# Patient Record
Sex: FEMALE | Race: WHITE | NOT HISPANIC OR LATINO | Employment: OTHER | URBAN - NONMETROPOLITAN AREA
[De-identification: names, ages, dates, MRNs, and addresses within clinical notes are randomized per-mention and may not be internally consistent; named-entity substitution may affect disease eponyms.]

---

## 2017-01-05 ENCOUNTER — OFFICE VISIT (OUTPATIENT)
Dept: INTERNAL MEDICINE | Facility: CLINIC | Age: 82
End: 2017-01-05

## 2017-01-05 VITALS
BODY MASS INDEX: 23.39 KG/M2 | WEIGHT: 137 LBS | HEIGHT: 64 IN | HEART RATE: 77 BPM | TEMPERATURE: 98.5 F | DIASTOLIC BLOOD PRESSURE: 80 MMHG | OXYGEN SATURATION: 96 % | SYSTOLIC BLOOD PRESSURE: 140 MMHG

## 2017-01-05 DIAGNOSIS — G81.90 HEMIPARESIS (HCC): ICD-10-CM

## 2017-01-05 DIAGNOSIS — D50.0 IRON DEFICIENCY ANEMIA DUE TO CHRONIC BLOOD LOSS: Primary | ICD-10-CM

## 2017-01-05 DIAGNOSIS — G40.209 PARTIAL SYMPTOMATIC EPILEPSY WITH COMPLEX PARTIAL SEIZURES, NOT INTRACTABLE, WITHOUT STATUS EPILEPTICUS (HCC): ICD-10-CM

## 2017-01-05 PROCEDURE — 99214 OFFICE O/P EST MOD 30 MIN: CPT | Performed by: INTERNAL MEDICINE

## 2017-01-05 PROCEDURE — 36415 COLL VENOUS BLD VENIPUNCTURE: CPT | Performed by: INTERNAL MEDICINE

## 2017-01-05 RX ORDER — IRON POLYSACCHARIDE COMPLEX 150 MG
150 CAPSULE ORAL DAILY
Qty: 60 CAPSULE | Refills: 2 | Status: SHIPPED | OUTPATIENT
Start: 2017-01-05 | End: 2017-06-21 | Stop reason: ALTCHOICE

## 2017-01-05 RX ORDER — ATORVASTATIN CALCIUM 40 MG/1
TABLET, FILM COATED ORAL
COMMUNITY
Start: 2016-12-19 | End: 2017-04-03 | Stop reason: SDUPTHER

## 2017-01-05 RX ORDER — OMEPRAZOLE 20 MG/1
20 CAPSULE, DELAYED RELEASE ORAL DAILY
Qty: 30 CAPSULE | Refills: 2 | Status: SHIPPED | OUTPATIENT
Start: 2017-01-05 | End: 2017-04-03 | Stop reason: SDUPTHER

## 2017-01-05 NOTE — MR AVS SNAPSHOT
Tanvi Obregon   1/5/2017 3:30 PM   Office Visit    Provider:  Jorge Olmos MD   Department:  [facility] PRIMARY CARE   Dept Phone:  [phone]                Your Full Care Plan              Today's Medication Changes          These changes are accurate as of: 1/5/17  4:03 PM.  If you have any questions, ask your nurse or doctor.               New Medication(s)Ordered:     omeprazole 20 MG capsule   Commonly known as:  priLOSEC   Take 1 capsule by mouth Daily.   Started by:  Jorge Olmos MD         Medication(s)that have changed:     atorvastatin 40 MG tablet   Commonly known as:  LIPITOR   What changed:  Another medication with the same name was removed. Continue taking this medication, and follow the directions you see here.   Changed by:  Jorge Olmos MD         Stop taking medication(s)listed here:     cefdinir 300 MG capsule   Commonly known as:  OMNICEF   Stopped by:  Jorge Olmos MD                Where to Get Your Medications      These medications were sent to [address] AT [facility] [phone]  - [phone] [address] [address]     Phone:  [phone]     iron polysaccharides 150 MG capsule    omeprazole 20 MG capsule                  Your Updated Medication List          This list is accurate as of: 1/5/17  4:03 PM.  Always use your most recent med list.                aspirin  MG tablet   Take 1 tablet by mouth daily.       atorvastatin 40 MG tablet   Commonly known as:  LIPITOR       famotidine 20 MG tablet   Commonly known as:  PEPCID   Take 1 tablet by mouth At Night As Needed for heartburn.       iron polysaccharides 150 MG capsule   Commonly known as:  NIFEREX   Take 1 capsule by mouth Daily.       levETIRAcetam 750 MG tablet   Commonly known as:  KEPPRA   Take 1 tablet by mouth 2 (Two) Times a Day.       levothyroxine 75 MCG tablet   Commonly known  "as:  SYNTHROID, LEVOTHROID   Take 1 tablet by mouth daily.       omeprazole 20 MG capsule   Commonly known as:  priLOSEC   Take 1 capsule by mouth Daily.               We Performed the Following     CBC (No Diff)       You Were Diagnosed With        Codes Comments    Iron deficiency anemia due to chronic blood loss    -  Primary ICD-10-CM: D50.0  ICD-9-CM: 280.0     Hemiparesis     ICD-10-CM: G81.90  ICD-9-CM: 342.90     Partial symptomatic epilepsy with complex partial seizures, not intractable, without status epilepticus     ICD-10-CM: G40.209  ICD-9-CM: 345.40       Instructions     None    Patient Instructions History      MyChart Signup     Our records indicate that you have declined [employer] signup. If you would like to sign up for [employer], please email [email or web address] or call [phone] to obtain an activation code.             Other Info from Your Visit           Your Appointments     Jun 02, 2017  2:30 PM EDT   Follow Up with Pernell Alba MD, FAAN   [facility] NEUROLOGY (--)    [address] [place] [address], [address] [address] [phone]           Arrive 15 minutes prior to appointment.              Allergies     No Known Allergies      Reason for Visit     [facility] follow up      Vital Signs     Blood Pressure Pulse Temperature Height Weight Oxygen Saturation    140/80 77 98.5 °F (36.9 °C) 64\" (162.6 cm) 137 lb (62.1 kg) 96%    Body Mass Index Smoking Status                23.52 kg/m2 Former Smoker          Problems and Diagnoses Noted     Paralysis one side of body    Iron deficiency anemia    Seizure disorder      "

## 2017-01-05 NOTE — PROGRESS NOTES
Subjective  Tanvi Obregon is a 81 y.o. female    HPI patient with a history of CVA and dementia brought in by her son recent hospitalization for confusion evaluation at the hospital showed evidence of seizure-like activity she is now on antiseizure medication she is coming here for post discharge for follow-up. No new complaints      The following portions of the patient's history were reviewed and updated as appropriate: allergies, current medications, past family history, past medical history, past social history, past surgical history, and problem list.     Review of Systems   Constitutional: Negative.  Negative for activity change, appetite change, fatigue and fever.   HENT: Negative for congestion, ear discharge, ear pain and trouble swallowing.    Eyes: Negative for photophobia and visual disturbance.   Respiratory: Negative for cough and shortness of breath.    Cardiovascular: Negative for chest pain and palpitations.   Gastrointestinal: Negative for abdominal distention, abdominal pain, constipation, diarrhea, nausea and vomiting.   Endocrine: Negative.    Genitourinary: Negative for dysuria, hematuria and urgency.   Musculoskeletal: Negative for arthralgias, back pain, joint swelling and myalgias.   Skin: Negative for color change and rash.   Allergic/Immunologic: Negative.    Neurological: Negative for dizziness, weakness, light-headedness and headaches.   Hematological: Negative for adenopathy. Does not bruise/bleed easily.   Psychiatric/Behavioral: Positive for confusion, decreased concentration and sleep disturbance. Negative for agitation and dysphoric mood. The patient is not nervous/anxious.        Vitals:    01/05/17 1514   BP: 140/80   Pulse: 77   Temp: 98.5 °F (36.9 °C)   SpO2: 96%       Objective  Physical Exam   Constitutional: She is oriented to person, place, and time. She appears well-developed and well-nourished. No distress.   HENT:   Nose: Nose normal.   Mouth/Throat: Oropharynx is  clear and moist.   Eyes: Conjunctivae and EOM are normal. No scleral icterus.   Neck: No tracheal deviation present. No thyromegaly present.   Cardiovascular: Normal rate and regular rhythm.  Exam reveals no friction rub.    No murmur heard.  Pulmonary/Chest: No respiratory distress. She has no wheezes. She has no rales.   Abdominal: Soft. She exhibits no distension and no mass. There is no tenderness. There is no guarding.   Musculoskeletal: Normal range of motion. She exhibits no deformity.   Lymphadenopathy:     She has no cervical adenopathy.   Neurological: She is alert and oriented to person, place, and time. She has normal reflexes. No cranial nerve deficit. Coordination normal.   Expressive aphasia   Skin: Skin is warm and dry. No rash noted. No erythema.   Psychiatric: She has a normal mood and affect. Her behavior is normal. Judgment and thought content normal.       Diagnoses and all orders for this visit:    Iron deficiency anemia due to chronic blood loss. Hospital records reviewed showed evidence of microcytic anemia she does not have overt bleeding currently not a good candidate for endoscopic evaluation as per discussion with her son will try empiric and tie acid therapy with omeprazole. Add on iron supplement repeat CBC in 4-6 weeks on follow-up. Hemodynamically stable denies shortness of breath or chest pain    Hemiparesis. Stable set up home health and physical therapy along with the occupational therapy    Partial symptomatic epilepsy with complex partial seizures, not intractable, without status epilepticus. Stable on current medications has an appointment with neurology

## 2017-01-06 LAB
ERYTHROCYTE [DISTWIDTH] IN BLOOD BY AUTOMATED COUNT: 18.8 % (ref 11.3–14.5)
HCT VFR BLD AUTO: 35 % (ref 34.5–44)
HGB BLD-MCNC: 10.5 G/DL (ref 11.5–15.5)
MCH RBC QN AUTO: 24.8 PG (ref 27–31)
MCHC RBC AUTO-ENTMCNC: 30 G/DL (ref 32–36)
MCV RBC AUTO: 82.5 FL (ref 80–99)
PLATELET # BLD AUTO: 504 10*3/MM3 (ref 150–450)
RBC # BLD AUTO: 4.24 10*6/MM3 (ref 3.89–5.14)
WBC # BLD AUTO: 11.29 10*3/MM3 (ref 3.5–10.8)

## 2017-01-16 ENCOUNTER — TELEPHONE (OUTPATIENT)
Dept: INTERNAL MEDICINE | Facility: CLINIC | Age: 82
End: 2017-01-16

## 2017-01-16 NOTE — TELEPHONE ENCOUNTER
Patients son is wondering if she could be referred to [facility]. He is also wondering if she could get speech therapy, OT, and PT.    Would you be ok with this?

## 2017-01-17 DIAGNOSIS — G81.90 HEMIPARESIS (HCC): Primary | ICD-10-CM

## 2017-01-27 ENCOUNTER — OFFICE VISIT (OUTPATIENT)
Dept: INTERNAL MEDICINE | Facility: CLINIC | Age: 82
End: 2017-01-27

## 2017-01-27 VITALS
DIASTOLIC BLOOD PRESSURE: 70 MMHG | OXYGEN SATURATION: 95 % | HEART RATE: 70 BPM | SYSTOLIC BLOOD PRESSURE: 130 MMHG | WEIGHT: 140.5 LBS | BODY MASS INDEX: 23.99 KG/M2 | TEMPERATURE: 98.3 F | HEIGHT: 64 IN

## 2017-01-27 DIAGNOSIS — M79.605 PAIN OF LEFT LOWER EXTREMITY: Primary | ICD-10-CM

## 2017-01-27 PROCEDURE — 99213 OFFICE O/P EST LOW 20 MIN: CPT | Performed by: INTERNAL MEDICINE

## 2017-01-27 NOTE — PROGRESS NOTES
Subjective  Tanvi Obreogn is a 81 y.o. female    HPI coming in with complains of left leg cramps intermittently most of the history provided by son was with her. Patient has severe expressive a face after her stroke. Has not had much difficulty in ambulating with assistance no rash and no swelling no new trauma    The following portions of the patient's history were reviewed and updated as appropriate: allergies, current medications, past family history, past medical history, past social history, past surgical history, and problem list.     Review of Systems   Constitutional: Negative.  Negative for activity change, appetite change, fatigue and fever.   HENT: Negative for congestion, ear discharge, ear pain and trouble swallowing.    Eyes: Negative for photophobia and visual disturbance.   Respiratory: Negative for cough and shortness of breath.    Cardiovascular: Negative for chest pain and palpitations.   Gastrointestinal: Negative for abdominal distention, abdominal pain, constipation, diarrhea, nausea and vomiting.   Endocrine: Negative.    Genitourinary: Negative for dysuria, hematuria and urgency.   Musculoskeletal: Negative for arthralgias, back pain, joint swelling and myalgias.        Lt leg cramps   Skin: Negative for color change and rash.   Allergic/Immunologic: Negative.    Neurological: Negative for dizziness, weakness, light-headedness and headaches.   Hematological: Negative for adenopathy. Does not bruise/bleed easily.   Psychiatric/Behavioral: Positive for decreased concentration and sleep disturbance. Negative for agitation, confusion and dysphoric mood. The patient is not nervous/anxious.        Vitals:    01/27/17 1351   BP: 130/70   Pulse: 70   Temp: 98.3 °F (36.8 °C)   SpO2: 95%       Objective  Physical Exam   Constitutional: She is oriented to person, place, and time. She appears well-developed and well-nourished. No distress.   HENT:   Nose: Nose normal.   Mouth/Throat: Oropharynx  is clear and moist.   Eyes: Conjunctivae and EOM are normal. No scleral icterus.   Neck: No tracheal deviation present. No thyromegaly present.   Cardiovascular: Normal rate and regular rhythm.  Exam reveals no friction rub.    No murmur heard.  Pulmonary/Chest: No respiratory distress. She has no wheezes. She has no rales.   Abdominal: Soft. She exhibits no distension and no mass. There is no tenderness. There is no guarding.   Musculoskeletal: Normal range of motion. She exhibits no deformity.   Lymphadenopathy:     She has no cervical adenopathy.   Neurological: She is alert and oriented to person, place, and time. She has normal reflexes. No cranial nerve deficit. Coordination normal.   Skin: Skin is warm and dry. No rash noted. No erythema.   Psychiatric: She has a normal mood and affect. Her behavior is normal. Judgment and thought content normal.       Diagnoses and all orders for this visit:    Pain of left lower extremity he did NSAIDs for now maybe having cramps no evidence of circulatory impairment consider lowering statin dose if symptoms persist

## 2017-01-27 NOTE — MR AVS SNAPSHOT
Dariankylejose RIVAS Sabas   1/27/2017 1:45 PM   Office Visit    Provider:  Jorge Olmos MD   Department:  [facility] PRIMARY CARE   Dept Phone:  [phone]                Your Full Care Plan              Today's Medication Changes          These changes are accurate as of: 1/27/17  4:15 PM.  If you have any questions, ask your nurse or doctor.               Stop taking medication(s)listed here:     famotidine 20 MG tablet   Commonly known as:  PEPCID   Stopped by:  Jorge Olmos MD                      Your Updated Medication List          This list is accurate as of: 1/27/17  4:15 PM.  Always use your most recent med list.                aspirin  MG tablet   Take 1 tablet by mouth daily.       atorvastatin 40 MG tablet   Commonly known as:  LIPITOR       iron polysaccharides 150 MG capsule   Commonly known as:  NIFEREX   Take 1 capsule by mouth Daily.       levETIRAcetam 750 MG tablet   Commonly known as:  KEPPRA   Take 1 tablet by mouth 2 (Two) Times a Day.       levothyroxine 75 MCG tablet   Commonly known as:  SYNTHROID, LEVOTHROID   Take 1 tablet by mouth daily.       omeprazole 20 MG capsule   Commonly known as:  priLOSEC   Take 1 capsule by mouth Daily.               You Were Diagnosed With        Codes Comments    Pain of left lower extremity    -  Primary ICD-10-CM: M79.605  ICD-9-CM: 729.5       Instructions     None    Patient Instructions History      MyChart Signup     Our records indicate that you have declined [facility] sign[facility]. If you would like to sign up for [facility], please email [email or web address] or call [phone] to obtain an activation code.             Other Info from Your Visit           Your Appointments     Feb 16, 2017  1:45 PM EST   Follow Up with Jorge Olmos MD   [facility] PRIMARY CARE (--)    [address]  [facility] [address] [phone]           Arrive 15 minutes prior  "to appointment.            Jun 02, 2017  2:30 PM EDT   Follow Up with Pernell Alba MD, FAAN   [facility] NEUROLOGY (--)    [address], [address] [address] [phone]           Arrive 15 minutes prior to appointment.              Allergies     No Known Allergies      Reason for Visit     Leg Pain Bilateral leg pain,     Eye Problem Vision issues      Vital Signs     Blood Pressure Pulse Temperature Height Weight Oxygen Saturation    130/70 70 98.3 °F (36.8 °C) 64\" (162.6 cm) 140 lb 8 oz (63.7 kg) 95%    Body Mass Index Smoking Status                24.12 kg/m2 Former Smoker          Problems and Diagnoses Noted     Left leg pain    -  Primary      "

## 2017-02-10 ENCOUNTER — OUTSIDE FACILITY SERVICE (OUTPATIENT)
Dept: INTERNAL MEDICINE | Facility: CLINIC | Age: 82
End: 2017-02-10

## 2017-02-10 PROCEDURE — G0180 MD CERTIFICATION HHA PATIENT: HCPCS | Performed by: INTERNAL MEDICINE

## 2017-02-16 ENCOUNTER — OFFICE VISIT (OUTPATIENT)
Dept: INTERNAL MEDICINE | Facility: CLINIC | Age: 82
End: 2017-02-16

## 2017-02-16 VITALS
SYSTOLIC BLOOD PRESSURE: 120 MMHG | HEART RATE: 82 BPM | HEIGHT: 64 IN | OXYGEN SATURATION: 95 % | TEMPERATURE: 98 F | WEIGHT: 140 LBS | BODY MASS INDEX: 23.9 KG/M2 | DIASTOLIC BLOOD PRESSURE: 75 MMHG

## 2017-02-16 DIAGNOSIS — G40.209 PARTIAL SYMPTOMATIC EPILEPSY WITH COMPLEX PARTIAL SEIZURES, NOT INTRACTABLE, WITHOUT STATUS EPILEPTICUS (HCC): Primary | ICD-10-CM

## 2017-02-16 PROCEDURE — 99213 OFFICE O/P EST LOW 20 MIN: CPT | Performed by: INTERNAL MEDICINE

## 2017-02-16 NOTE — PROGRESS NOTES
Subjective  Tanvi Obregon is a 81 y.o. female    HPI coming in for follow-up accompanied by her son patient with a history of CVA with expressive a face area is ambulating reasonably well now history of suspected seizure disorder in the past but no overt seizure activity has significant drowsiness and confusion with Keppra. Son is requesting if we can discontinue this. By mouth intake is fair no incontinence noted    The following portions of the patient's history were reviewed and updated as appropriate: allergies, current medications, past family history, past medical history, past social history, past surgical history, and problem list.     Review of Systems   Constitutional: Negative.  Negative for activity change, appetite change, fatigue and fever.   HENT: Negative for congestion, ear discharge, ear pain and trouble swallowing.    Eyes: Negative for photophobia and visual disturbance.   Respiratory: Negative for cough and shortness of breath.    Cardiovascular: Negative for chest pain and palpitations.   Gastrointestinal: Negative for abdominal distention, abdominal pain, constipation, diarrhea, nausea and vomiting.   Endocrine: Negative.    Genitourinary: Negative for dysuria, hematuria and urgency.   Musculoskeletal: Negative for arthralgias, back pain, joint swelling and myalgias.   Skin: Negative for color change and rash.   Allergic/Immunologic: Negative.    Neurological: Negative for dizziness, weakness, light-headedness and headaches.        No seizure activity   Hematological: Negative for adenopathy. Does not bruise/bleed easily.   Psychiatric/Behavioral: Positive for decreased concentration and sleep disturbance. Negative for agitation, confusion and dysphoric mood. The patient is not nervous/anxious.        Vitals:    02/16/17 1348   BP: 120/75   Pulse: 82   Temp: 98 °F (36.7 °C)   SpO2: 95%       Objective  Physical Exam   Constitutional: She is oriented to person, place, and time. She  appears well-developed and well-nourished. No distress.   HENT:   Nose: Nose normal.   Mouth/Throat: Oropharynx is clear and moist.   Eyes: Conjunctivae and EOM are normal. No scleral icterus.   Neck: No tracheal deviation present. No thyromegaly present.   Cardiovascular: Normal rate and regular rhythm.  Exam reveals no friction rub.    No murmur heard.  Pulmonary/Chest: No respiratory distress. She has no wheezes. She has no rales.   Abdominal: Soft. She exhibits no distension and no mass. There is no tenderness. There is no guarding.   Musculoskeletal: Normal range of motion. She exhibits no deformity.   Lymphadenopathy:     She has no cervical adenopathy.   Neurological: She is alert and oriented to person, place, and time. She has normal reflexes. No cranial nerve deficit. Coordination normal.   Expressive aphasia   Skin: Skin is warm and dry. No rash noted. No erythema.   Psychiatric: She has a normal mood and affect. Her behavior is normal. Judgment and thought content normal.       Diagnoses and all orders for this visit:    Partial symptomatic epilepsy with complex partial seizures, not intractable, without status epilepticus has been stable without seizure activity most recent EEG without evidence of seizure activity. Will attempt a slow taper over 2 weeks this was discussed and written down for her son

## 2017-03-07 RX ORDER — LEVOTHYROXINE SODIUM 0.07 MG/1
75 TABLET ORAL DAILY
Qty: 90 TABLET | Refills: 1 | Status: SHIPPED | OUTPATIENT
Start: 2017-03-07 | End: 2017-05-19 | Stop reason: SDUPTHER

## 2017-03-08 ENCOUNTER — TELEPHONE (OUTPATIENT)
Dept: INTERNAL MEDICINE | Facility: CLINIC | Age: 82
End: 2017-03-08

## 2017-03-08 NOTE — TELEPHONE ENCOUNTER
Patients son has called requesting to seen the the dosage of Atorvastatin could be lowered. He states that he would like to see if this will help with his mother's walking and the pain in her leg.    Guido Obregon - [phone].

## 2017-04-03 RX ORDER — ATORVASTATIN CALCIUM 40 MG/1
40 TABLET, FILM COATED ORAL DAILY
Qty: 90 TABLET | Refills: 3 | Status: SHIPPED | OUTPATIENT
Start: 2017-04-03

## 2017-04-03 RX ORDER — OMEPRAZOLE 20 MG/1
20 CAPSULE, DELAYED RELEASE ORAL DAILY
Qty: 90 CAPSULE | Refills: 3 | Status: SHIPPED | OUTPATIENT
Start: 2017-04-03

## 2017-04-04 ENCOUNTER — OUTSIDE FACILITY SERVICE (OUTPATIENT)
Dept: INTERNAL MEDICINE | Facility: CLINIC | Age: 82
End: 2017-04-04

## 2017-04-04 PROCEDURE — G0179 MD RECERTIFICATION HHA PT: HCPCS | Performed by: INTERNAL MEDICINE

## 2017-05-05 ENCOUNTER — TELEPHONE (OUTPATIENT)
Dept: INTERNAL MEDICINE | Facility: CLINIC | Age: 82
End: 2017-05-05

## 2017-05-09 ENCOUNTER — TELEPHONE (OUTPATIENT)
Dept: INTERNAL MEDICINE | Facility: CLINIC | Age: 82
End: 2017-05-09

## 2017-05-10 ENCOUNTER — TELEPHONE (OUTPATIENT)
Dept: INTERNAL MEDICINE | Facility: CLINIC | Age: 82
End: 2017-05-10

## 2017-05-19 ENCOUNTER — OFFICE VISIT (OUTPATIENT)
Dept: INTERNAL MEDICINE | Facility: CLINIC | Age: 82
End: 2017-05-19

## 2017-05-19 VITALS
WEIGHT: 144 LBS | HEIGHT: 64 IN | BODY MASS INDEX: 24.59 KG/M2 | TEMPERATURE: 98.2 F | RESPIRATION RATE: 12 BRPM | OXYGEN SATURATION: 95 % | SYSTOLIC BLOOD PRESSURE: 142 MMHG | DIASTOLIC BLOOD PRESSURE: 70 MMHG | HEART RATE: 68 BPM

## 2017-05-19 DIAGNOSIS — D50.0 IRON DEFICIENCY ANEMIA DUE TO CHRONIC BLOOD LOSS: ICD-10-CM

## 2017-05-19 DIAGNOSIS — I10 BENIGN ESSENTIAL HTN: ICD-10-CM

## 2017-05-19 DIAGNOSIS — E03.9 ADULT HYPOTHYROIDISM: Primary | ICD-10-CM

## 2017-05-19 DIAGNOSIS — R47.01 EXPRESSIVE APHASIA: ICD-10-CM

## 2017-05-19 LAB
ERYTHROCYTE [DISTWIDTH] IN BLOOD BY AUTOMATED COUNT: 14.1 % (ref 11.5–14.5)
HCT VFR BLD AUTO: 41.7 % (ref 37–47)
HGB BLD-MCNC: 13.6 G/DL (ref 12–16)
IRON SERPL-MCNC: 68 MCG/DL (ref 37–181)
MCH RBC QN AUTO: 31.2 PG (ref 27–31)
MCHC RBC AUTO-ENTMCNC: 32.6 G/DL (ref 30–37)
MCV RBC AUTO: 95.6 FL (ref 81–99)
PLATELET # BLD AUTO: 323 10*3/MM3 (ref 130–400)
RBC # BLD AUTO: 4.36 10*6/MM3 (ref 4.2–5.4)
TSH SERPL DL<=0.005 MIU/L-ACNC: 2.3 MIU/ML (ref 0.47–4.68)
WBC # BLD AUTO: 9.76 10*3/MM3 (ref 4.8–10.8)

## 2017-05-19 PROCEDURE — 99214 OFFICE O/P EST MOD 30 MIN: CPT | Performed by: INTERNAL MEDICINE

## 2017-05-19 RX ORDER — LEVOTHYROXINE SODIUM 0.07 MG/1
75 TABLET ORAL DAILY
Qty: 90 TABLET | Refills: 3 | Status: SHIPPED | OUTPATIENT
Start: 2017-05-19 | End: 2017-05-19 | Stop reason: SDUPTHER

## 2017-05-19 RX ORDER — LEVOTHYROXINE SODIUM 0.07 MG/1
75 TABLET ORAL DAILY
Qty: 90 TABLET | Refills: 3 | Status: SHIPPED | OUTPATIENT
Start: 2017-05-19

## 2017-05-19 RX ORDER — LEVETIRACETAM 500 MG/1
500 TABLET ORAL DAILY
Qty: 30 TABLET | Refills: 11 | Status: SHIPPED | OUTPATIENT
Start: 2017-05-19 | End: 2017-06-21 | Stop reason: SDUPTHER

## 2017-05-23 ENCOUNTER — TELEPHONE (OUTPATIENT)
Dept: INTERNAL MEDICINE | Facility: CLINIC | Age: 82
End: 2017-05-23

## 2017-06-21 ENCOUNTER — OFFICE VISIT (OUTPATIENT)
Dept: NEUROLOGY | Facility: CLINIC | Age: 82
End: 2017-06-21

## 2017-06-21 VITALS
WEIGHT: 143 LBS | HEIGHT: 64 IN | OXYGEN SATURATION: 96 % | BODY MASS INDEX: 24.41 KG/M2 | HEART RATE: 75 BPM | DIASTOLIC BLOOD PRESSURE: 64 MMHG | SYSTOLIC BLOOD PRESSURE: 138 MMHG

## 2017-06-21 DIAGNOSIS — I69.30 SEQUELAE, POST-STROKE: ICD-10-CM

## 2017-06-21 DIAGNOSIS — R56.9 GENERALIZED CONVULSIVE SEIZURES (HCC): Primary | ICD-10-CM

## 2017-06-21 PROCEDURE — 99214 OFFICE O/P EST MOD 30 MIN: CPT | Performed by: PSYCHIATRY & NEUROLOGY

## 2017-06-21 RX ORDER — LEVETIRACETAM 500 MG/1
500 TABLET ORAL DAILY
Qty: 30 TABLET | Refills: 11 | Status: SHIPPED | OUTPATIENT
Start: 2017-06-21

## 2017-06-21 NOTE — PROGRESS NOTES
[facility] NEUROLOGY [place] PROGRESS NOTE  History of Present Illness     Date: 6/21/2017    Patient Identification  Tanvi Obregon is a 82 y.o. female.    Patient information was obtained from patient.  History/Exam limitations: none.    Original consultation requested by: Dr. Olmos      Chief Complaint   Stroke (6 month follow up, pt had ischemic stroke 06/01/2016. Pt son states pt had to go into hospital due to having seizure in same part of brain where pt had her stroke. MRI done at [facility]. States she has not been recovering as quickly since that); Depression (Due to slow recovery, pt is suffering from depression); and Leg Pain (Pt c/o leg swelling and pain )      History of Present Illness   Patient is a delightful 82-year-old lady who separate from a stroke 6 months ago which left her with speech difficulty and secondary seizure.  Patient been seizure-free on Keppra 500 mg once a day.  Patient denies any side effects on current regimen.  I've explained to the patient that Keppra with the half-life of 12 hours we may need to increase it to one pill twice a day.      PMH:   Past Medical History:   Diagnosis Date   • Abnormal vaginal bleeding    • Disease of thyroid gland    • Hypertension    • SOB (shortness of breath)    • Stroke        Past Surgical History: No past surgical history on file.    Family Hisotry: No family history on file.    Social History:   Social History     Social History   • Marital status:      Spouse name: N/A   • Number of children: N/A   • Years of education: N/A     Occupational History   • Not on file.     Social History Main Topics   • Smoking status: Former Smoker   • Smokeless tobacco: Not on file   • Alcohol use No   • Drug use: No   • Sexual activity: Not on file     Other Topics Concern   • Not on file     Social History Narrative       Medications:   Current Outpatient Prescriptions   Medication Sig Dispense Refill   • aspirin  MG tablet Take 1  "tablet by mouth daily. 100 tablet 2   • atorvastatin (LIPITOR) 40 MG tablet Take 1 tablet by mouth Daily. 90 tablet 3   • levETIRAcetam (KEPPRA) 500 MG tablet Take 1 tablet by mouth Daily. 30 tablet 11   • levothyroxine (SYNTHROID, LEVOTHROID) 75 MCG tablet Take 1 tablet by mouth Daily. 90 tablet 3   • omeprazole (priLOSEC) 20 MG capsule Take 1 capsule by mouth Daily. 90 capsule 3     No current facility-administered medications for this visit.        Allergy: No Known Allergies    Review of Systems:  Review of Systems   Constitutional: Negative for chills and fever.   HENT: Negative for congestion, ear pain, hearing loss, rhinorrhea and sore throat.    Eyes: Negative for pain, discharge and redness.   Respiratory: Negative for cough, shortness of breath, wheezing and stridor.    Cardiovascular: Negative for chest pain, palpitations and leg swelling.   Gastrointestinal: Negative for abdominal pain, constipation, nausea and vomiting.   Endocrine: Negative for cold intolerance, heat intolerance and polyphagia.   Genitourinary: Negative for dysuria, flank pain, frequency and urgency.   Musculoskeletal: Positive for gait problem. Negative for joint swelling, myalgias, neck pain and neck stiffness.   Skin: Negative for pallor, rash and wound.   Allergic/Immunologic: Negative for environmental allergies.   Neurological: Positive for speech difficulty and weakness. Negative for dizziness, tremors, seizures, syncope, facial asymmetry, light-headedness, numbness and headaches.   Hematological: Negative for adenopathy.   Psychiatric/Behavioral: Negative for confusion and hallucinations. The patient is not nervous/anxious.        Physical Exam     Vitals:    06/21/17 0957   BP: 138/64   Pulse: 75   SpO2: 96%   Weight: 143 lb (64.9 kg)   Height: 64\" (162.6 cm)     GENERAL: Patient is pleasant, cooperative, appears to be stated age.  Body habitus is endomorphic.  SKIN AND EXTREMITIES:  No skin rashes or lesions are noted.  No " cyanosis, clubbing or edema of the extremities.    HEAD:  Head is normocephalic and atraumatic.    NECK: Neck are non-tender without thyromegaly or adenopathy.  Carotic upstrokes are 1+/4.  No cranial or cervical bruits.  The neck is supple with a full range of motion.   ENT: palate elevate symmetrically, no evidence of high arch palate, tongue midline erythema in posterior pharynx, Mallampati Classification Class III   CARDIOVASCULAR:  Regular rate and rhythm with normal S1 and S2 without rub or gallop.  RESPIRATORY:  Clear to auscultation without wheezes or crackle   ABDOMEN:  Soft and non-tender, positive bowel sound without hepatosplenomegaly  BACK:  Back is straight without midline defect.    PSYCH:  Higher cortical function/mental status:  The patient is alert.  She is oriented x3 to time, place and person.  Recent and the remote memory appear normal.  The patient has a good fund of knowledge.  There is no visual or auditory hallucination or suicidal or homicidal ideation.  SPEECH:There is no gross evidence of aphasia, dysarthria or agnosia.      CRANIAL NERVES:  Pupils are 4mm, equal round reactive to light, reacting briskly to 2mm without afferent pupillary defect.  Visual fields are intact to confrontation testing.  Fundoscopic examination reveals sharp disk margins with normal vasculature.  No papilledema, hemorrhages or exudates.  Extraocular movements are full and smooth with normal pursuits and saccades.  No nystagmus noted.  The face is symmetric. palate elevate symmetrically, Tongue midline, positive gag reflex. The remainder of the cranial nerves are intact and symmetrical.    MOTOR: Strength is 5/5 throughout with normal tone and bulk with the following exceptions, 4/5 intrinsic muscles of the hands and feet.  No involuntary movements noted.    Deep Tendon Reflexes: are 2/4 and symmetrical in the upper extremities, 2/4 and symmetrical at the knees and 1/4 and symmetrical at the Achilles tendon.   Plantar responses were down-going bilaterally.    SENSATION:  Intact to pinprick, light touch, vibration and proprioception.  Coordination:  The patient normally performs finger-nose-finger, heel-to-knee-to-shin and rapid alternating movements in symmetrical fashion.    COORDINATION AND GAIT:  The patient walks with a narrow-based gait.  Patient is able to heel-toe and tandem walk forward and backwards without difficulty.  Romberg and monopedal  Romberg are negative.    MUSCULOSKELETAL: Range of motion normal, no clubbing, cyanosis, or edema.  No joint swelling.            Studies: I have personally reviewed the following and discussed with the patient.  Results for orders placed or performed in visit on 05/19/17   TSH   Result Value Ref Range    TSH 2.300 0.470 - 4.680 mIU/mL   CBC (No Diff)   Result Value Ref Range    WBC 9.76 4.80 - 10.80 10*3/mm3    RBC 4.36 4.20 - 5.40 10*6/mm3    Hemoglobin 13.6 12.0 - 16.0 g/dL    Hematocrit 41.7 37.0 - 47.0 %    MCV 95.6 81.0 - 99.0 fL    MCH 31.2 (H) 27.0 - 31.0 pg    MCHC 32.6 30.0 - 37.0 g/dL    RDW 14.1 11.5 - 14.5 %    Platelets 323 130 - 400 10*3/mm3   Iron   Result Value Ref Range    Iron 68 37 - 181 mcg/dL       Records Reviewed: I have personally reviewed her previous medical record.    Tanvi was seen today for stroke, depression and leg pain.    Diagnoses and all orders for this visit:    Generalized convulsive seizures    Sequelae, post-stroke    Other orders  -     levETIRAcetam (KEPPRA) 500 MG tablet; Take 1 tablet by mouth Daily.      Treatments:  1.  Continue patient on 1 aspirin a day for her stroke  2.  Continue Lipitor to lowering cholesterol  3.  Monitor blood pressure periodically  4.  Increase Keppra to 500 mg 1 pill twice a day to prevent recurrent seizure  5.  Counseled patient on seizure as follow-up  I have counseled patient extensively on epileptic seizure.  Epileptic seizures are a common and important medical problem, with about one in 11  persons experiencing at least one seizure at some point. The management of patients with epilepsy is often challenging, as evidenced by a recent report that over one half of all patients with epilepsy continue to experience at least occasional seizures despite treatment with antiepileptic medications.   We have also discussed various diagnostic testing.  Diagnostic studies must be tailored to this particular patient. Basic laboratory evaluation focuses on detecting systemic disturbances potentially associated with seizures were explored.  The imaging modality of choice is magnetic resonance imaging (MRI). Electroencephalography (EEG) is helpful in the evaluation of this patient. The utility of EEG includes detection of epileptiform activity, strengthening the putative diagnosis; identification of focal electrocerebral abnormalities suggesting a focal structural brain lesion; and documentation of specific epileptiform patterns associated with particular epilepsy syndromes would be very beneficial.   For patients with relatively infrequent seizures (in whom it is difficult to gauge the response to treatment without waiting many months for the next seizure to occur), a logical approach is to promptly increase the medication to the maximum tolerated dosage and maintain it at this level. This can be accomplished by increasing the agent until the patient begins to experience expected dose-related side effects, and then reducing the dosage to the immediately previous dosage that did not produce the adverse effects. Once a steady state with the refined dosage has been achieved, it is useful to check the trough drug serum level as a reference point for the maximum tolerated dosage.  If the patient eventually experiences a seizure when the serum level is at the reference point, the trial of medication can be considered a failure. This procedure enables assessment of efficacy in a manner significantly more efficient than  simply beginning at an average dosage and waiting for the next seizure before the next increase is implemented. If adequate seizure control-which should be defined as complete seizure control for most patients-is not achieved at the maximum tolerated dosage of the first medication, consideration should be given to referring the patient for neurologic consultation, if this has not yet been undertaken. Usually, a second agent will need to be added.      This Document is signed by Pernell Alba MD, FAAN, FAASM   June 21, 20176:09 PM

## 2017-07-10 ENCOUNTER — OFFICE VISIT (OUTPATIENT)
Dept: INTERNAL MEDICINE | Facility: CLINIC | Age: 82
End: 2017-07-10

## 2017-07-10 VITALS
SYSTOLIC BLOOD PRESSURE: 130 MMHG | TEMPERATURE: 98.6 F | WEIGHT: 141 LBS | BODY MASS INDEX: 24.07 KG/M2 | HEART RATE: 65 BPM | DIASTOLIC BLOOD PRESSURE: 75 MMHG | OXYGEN SATURATION: 96 % | HEIGHT: 64 IN

## 2017-07-10 DIAGNOSIS — M25.562 CHRONIC PAIN OF LEFT KNEE: Primary | ICD-10-CM

## 2017-07-10 DIAGNOSIS — G89.29 CHRONIC PAIN OF LEFT KNEE: Primary | ICD-10-CM

## 2017-07-10 PROCEDURE — 20610 DRAIN/INJ JOINT/BURSA W/O US: CPT | Performed by: INTERNAL MEDICINE

## 2017-07-10 PROCEDURE — 99213 OFFICE O/P EST LOW 20 MIN: CPT | Performed by: INTERNAL MEDICINE

## 2017-07-10 RX ORDER — TRIAMCINOLONE ACETONIDE 40 MG/ML
40 INJECTION, SUSPENSION INTRA-ARTICULAR; INTRAMUSCULAR ONCE
Status: COMPLETED | OUTPATIENT
Start: 2017-07-10 | End: 2017-07-10

## 2017-07-10 RX ADMIN — TRIAMCINOLONE ACETONIDE 40 MG: 40 INJECTION, SUSPENSION INTRA-ARTICULAR; INTRAMUSCULAR at 16:22

## 2017-07-10 NOTE — PROGRESS NOTES
Subjective  Tanvi Obregon is a 82 y.o. female    HPI coming in for evaluation she is accompanied by her son and friend has been complaining of left-sided knee pain. Patient poor historian secondary to her expressive a fees after CVA causing right hemiparesis . no recent trauma no recent change in meds    The following portions of the patient's history were reviewed and updated as appropriate: allergies, current medications, past family history, past medical history, past social history, past surgical history, and problem list.     Review of Systems   Constitutional: Negative.  Negative for activity change, appetite change, fatigue and fever.   HENT: Negative for congestion, ear discharge, ear pain and trouble swallowing.    Eyes: Negative for photophobia and visual disturbance.   Respiratory: Negative for cough and shortness of breath.    Cardiovascular: Positive for leg swelling. Negative for chest pain and palpitations.   Gastrointestinal: Negative for abdominal distention, abdominal pain, constipation, diarrhea, nausea and vomiting.   Endocrine: Negative.    Genitourinary: Negative for dysuria, hematuria and urgency.   Musculoskeletal: Positive for arthralgias. Negative for back pain, joint swelling and myalgias.   Skin: Negative for color change and rash.   Allergic/Immunologic: Negative.    Neurological: Negative for dizziness, weakness, light-headedness and headaches.        Expressive aphasia   Hematological: Negative for adenopathy. Does not bruise/bleed easily.   Psychiatric/Behavioral: Negative for agitation, confusion and dysphoric mood. The patient is not nervous/anxious.        Vitals:    07/10/17 1311   BP: 130/75   Pulse: 65   Temp: 98.6 °F (37 °C)   SpO2: 96%       Objective  Physical Exam   Constitutional: She is oriented to person, place, and time. She appears well-developed and well-nourished. No distress.   HENT:   Nose: Nose normal.   Mouth/Throat: Oropharynx is clear and moist.   Eyes:  Conjunctivae and EOM are normal. No scleral icterus.   Neck: No tracheal deviation present. No thyromegaly present.   Cardiovascular: Normal rate and regular rhythm.  Exam reveals no friction rub.    No murmur heard.  Pulmonary/Chest: No respiratory distress. She has no wheezes. She has no rales.   Abdominal: Soft. She exhibits no distension and no mass. There is no tenderness. There is no guarding.   Musculoskeletal: Normal range of motion. She exhibits deformity.   Crepitus on passive movement of the left knee. minimal swelling   Lymphadenopathy:     She has no cervical adenopathy.   Neurological: She is alert and oriented to person, place, and time. She has normal reflexes. No cranial nerve deficit. Coordination normal.   Expressive aphasia   rt le weakness   Skin: Skin is warm and dry. No rash noted. No erythema.   Psychiatric: She has a normal mood and affect. Her behavior is normal. Judgment and thought content normal.       Diagnoses and all orders for this visit:    Chronic pain of left knee suspect secondary to DJD. Discuss intra-articular steroid injection patient agreeable with this procedure below  Arthrocentesis  Date/Time: 7/10/2017 4:54 PM  Performed by: CHEMO ADAMES  Authorized by: CHEMO ADAMES   Consent: Verbal consent obtained.  Risks and benefits: risks, benefits and alternatives were discussed  Consent given by: patient and guardian  Patient understanding: patient states understanding of the procedure being performed  Patient identity confirmed: verbally with patient  Indications: joint swelling and pain   Body area: knee  Local anesthesia used: Ethyl chloride spray.    Anesthesia:  Local anesthesia used: Ethyl chloride spray.  Sedation:  Patient sedated: no    Needle size: 18 G  Ultrasound guidance: no  Approach: medial  Aspirate: serous  Triamcinolone amount: 40 mg  Lidocaine 1% amount: 1 mL  Patient tolerance: Patient tolerated the procedure well with no immediate complications        -      triamcinolone acetonide (KENALOG-40) injection 40 mg; Inject 1 mL into the shoulder, thigh, or buttocks 1 (One) Time.

## 2017-12-21 ENCOUNTER — OFFICE VISIT (OUTPATIENT)
Dept: NEUROLOGY | Facility: CLINIC | Age: 82
End: 2017-12-21

## 2017-12-21 VITALS
SYSTOLIC BLOOD PRESSURE: 142 MMHG | OXYGEN SATURATION: 98 % | WEIGHT: 141 LBS | HEIGHT: 64 IN | BODY MASS INDEX: 24.07 KG/M2 | DIASTOLIC BLOOD PRESSURE: 68 MMHG | HEART RATE: 73 BPM

## 2017-12-21 DIAGNOSIS — G40.209 PARTIAL SYMPTOMATIC EPILEPSY WITH COMPLEX PARTIAL SEIZURES, NOT INTRACTABLE, WITHOUT STATUS EPILEPTICUS (HCC): ICD-10-CM

## 2017-12-21 DIAGNOSIS — I69.30 SEQUELAE, POST-STROKE: Primary | ICD-10-CM

## 2017-12-21 PROCEDURE — 99213 OFFICE O/P EST LOW 20 MIN: CPT | Performed by: PSYCHIATRY & NEUROLOGY

## 2017-12-21 RX ORDER — MELATONIN
1000 DAILY
COMMUNITY

## 2017-12-21 RX ORDER — LISINOPRIL 5 MG/1
1 TABLET ORAL DAILY
COMMUNITY
Start: 2017-12-15

## 2017-12-21 NOTE — PROGRESS NOTES
[facility] NEUROLOGY [place] PROGRESS NOTE  History of Present Illness     Date: 12/21/2017    Patient Identification  Tanvi Obregon is a 82 y.o. female.    Patient information was obtained from patient.  History/Exam limitations: none.    Original consultation requested by: Kenton Wagoner MD      Chief Complaint   Follow-up (Pt in office today for 6 month follow up, post-stroke )      History of Present Illness   Patient is a pleasant 82-year-old referred to [facility] neurology [place] for evaluation of ischemic stroke on June 1, 2016.  Patient also developed seizure during her stroke.  Patient had been seizure-free since she had been on Keppra 500 mg once a day.  Patient tolerating medication well she is also taking Lipitor for hyperlipidemia and aspirin once a day for stroke prevention.    PMH:   Past Medical History:   Diagnosis Date   • Abnormal vaginal bleeding    • Disease of thyroid gland    • Hypertension    • SOB (shortness of breath)    • Stroke        Past Surgical History: No past surgical history on file.    Family Hisotry: No family history on file.    Social History:   Social History     Social History   • Marital status:      Spouse name: N/A   • Number of children: N/A   • Years of education: N/A     Occupational History   • Not on file.     Social History Main Topics   • Smoking status: Former Smoker   • Smokeless tobacco: Never Used   • Alcohol use No   • Drug use: No   • Sexual activity: Not on file     Other Topics Concern   • Not on file     Social History Narrative       Medications:   Current Outpatient Prescriptions   Medication Sig Dispense Refill   • aspirin  MG tablet Take 1 tablet by mouth daily. 100 tablet 2   • atorvastatin (LIPITOR) 40 MG tablet Take 1 tablet by mouth Daily. 90 tablet 3   • cholecalciferol (VITAMIN D3) 1000 units tablet Take 1,000 Units by mouth Daily.     • levETIRAcetam (KEPPRA) 500 MG tablet Take 1 tablet by mouth Daily. 30  "tablet 11   • levothyroxine (SYNTHROID, LEVOTHROID) 75 MCG tablet Take 1 tablet by mouth Daily. 90 tablet 3   • lisinopril (PRINIVIL,ZESTRIL) 5 MG tablet 1 tablet Daily.     • omeprazole (priLOSEC) 20 MG capsule Take 1 capsule by mouth Daily. 90 capsule 3     No current facility-administered medications for this visit.        Allergy: No Known Allergies    Review of Systems:  Review of Systems   Constitutional: Negative for chills and fever.   HENT: Negative for congestion, ear pain, hearing loss, rhinorrhea and sore throat.    Eyes: Negative for pain, discharge and redness.   Respiratory: Negative for cough, shortness of breath, wheezing and stridor.    Cardiovascular: Negative for chest pain, palpitations and leg swelling.   Gastrointestinal: Negative for abdominal pain, constipation, nausea and vomiting.   Endocrine: Negative for cold intolerance, heat intolerance and polyphagia.   Genitourinary: Negative for dysuria, flank pain, frequency and urgency.   Musculoskeletal: Positive for gait problem. Negative for joint swelling, myalgias, neck pain and neck stiffness.   Skin: Negative for pallor, rash and wound.   Allergic/Immunologic: Negative for environmental allergies.   Neurological: Positive for seizures, speech difficulty and weakness. Negative for dizziness, tremors, syncope, facial asymmetry, light-headedness, numbness and headaches.   Hematological: Negative for adenopathy.   Psychiatric/Behavioral: Negative for confusion and hallucinations. The patient is not nervous/anxious.        Physical Exam     Vitals:    12/21/17 1151   BP: 142/68   Pulse: 73   SpO2: 98%   Weight: 64 kg (141 lb)   Height: 162.6 cm (64\")     GENERAL: Patient is pleasant, cooperative, appears to be stated age.  Body habitus is endomorphic.  SKIN AND EXTREMITIES:  No skin rashes or lesions are noted.  No cyanosis, clubbing or edema of the extremities.    HEAD:  Head is normocephalic and atraumatic.    NECK: Neck are non-tender without " thyromegaly or adenopathy.  Carotic upstrokes are 1+/4.  No cranial or cervical bruits.  The neck is supple with a full range of motion.   ENT: palate elevate symmetrically, no evidence of high arch palate, tongue midline erythema in posterior pharynx, Mallampati Classification Class III   CARDIOVASCULAR:  Regular rate and rhythm with normal S1 and S2 without rub or gallop.  RESPIRATORY:  Clear to auscultation without wheezes or crackle   ABDOMEN:  Soft and non-tender, positive bowel sound without hepatosplenomegaly  BACK:  Back is straight without midline defect.    PSYCH:  Higher cortical function/mental status:  The patient is alert.  She is oriented x3 to time, place and person.  Recent and the remote memory appear normal.  The patient has a good fund of knowledge.  There is no visual or auditory hallucination or suicidal or homicidal ideation.  SPEECH:Persistent expressive dysphagia and also having receptive dysphasia      CRANIAL NERVES:  Pupils are 4mm, equal round reactive to light, reacting briskly to 2mm without afferent pupillary defect.  Visual fields are intact to confrontation testing.  Fundoscopic examination reveals sharp disk margins with normal vasculature.  No papilledema, hemorrhages or exudates.  Extraocular movements are full and smooth with normal pursuits and saccades.  No nystagmus noted.  The face is symmetric. palate elevate symmetrically, Tongue midline, positive gag reflex. The remainder of the cranial nerves are intact and symmetrical.    MOTOR: Strength is 5/5 throughout with normal bulk with the following exceptions, 4/5 intrinsic muscles of the hands and feet.  No involuntary movements noted.  Increased tone in the right upper and lower extremity with scissoring gait  Deep Tendon Reflexes: are 2/4 and symmetrical in the upper extremities, 2/4 and symmetrical at the knees and 1/4 and symmetrical at the Achilles tendon.  Plantar responses were down-going bilaterally.    SENSATION:   Intact to pinprick, light touch, vibration and proprioception.  Coordination:  The patient normally performs finger-nose-finger, heel-to-knee-to-shin and rapid alternating movements in symmetrical fashion.    COORDINATION AND GAIT:  Patient ambulates with handheld can with spasticity on the right side.    MUSCULOSKELETAL: Range of motion normal, no clubbing, cyanosis, or edema.  No joint swelling.            Records Reviewed: I have personally reviewed her previous medical record.    Tanvi was seen today for follow-up.    Diagnoses and all orders for this visit:    Sequelae, post-stroke    Partial symptomatic epilepsy with complex partial seizures, not intractable, without status epilepticus      Discussion:  1.  Continue patient to Keppra 500 mg a day  2.  Encourage regular sleep wake schedule  3.  Avoid sleep deprivation  4.  Encourage good sleep hygiene  5.  I've explained to patient how sleep deprivation can lead to seizure  6. Counseled patient extensively on seizure as follow  I have counseled patient extensively on epileptic seizure.  Epileptic seizures are a common and important medical problem, with about one in 11 persons experiencing at least one seizure at some point. The management of patients with epilepsy is often challenging, as evidenced by a recent report that over one half of all patients with epilepsy continue to experience at least occasional seizures despite treatment with antiepileptic medications.   We have also discussed various diagnostic testing.  Diagnostic studies must be tailored to this particular patient. Basic laboratory evaluation focuses on detecting systemic disturbances potentially associated with seizures were explored.  The imaging modality of choice is magnetic resonance imaging (MRI). Electroencephalography (EEG) is helpful in the evaluation of this patient. The utility of EEG includes detection of epileptiform activity, strengthening the putative diagnosis;  identification of focal electrocerebral abnormalities suggesting a focal structural brain lesion; and documentation of specific epileptiform patterns associated with particular epilepsy syndromes would be very beneficial.   For patients with relatively infrequent seizures (in whom it is difficult to gauge the response to treatment without waiting many months for the next seizure to occur), a logical approach is to promptly increase the medication to the maximum tolerated dosage and maintain it at this level. This can be accomplished by increasing the agent until the patient begins to experience expected dose-related side effects, and then reducing the dosage to the immediately previous dosage that did not produce the adverse effects. Once a steady state with the refined dosage has been achieved, it is useful to check the trough drug serum level as a reference point for the maximum tolerated dosage.  If the patient eventually experiences a seizure when the serum level is at the reference point, the trial of medication can be considered a failure. This procedure enables assessment of efficacy in a manner significantly more efficient than simply beginning at an average dosage and waiting for the next seizure before the next increase is implemented. If adequate seizure control-which should be defined as complete seizure control for most patients-is not achieved at the maximum tolerated dosage of the first medication, consideration should be given to referring the patient for neurologic consultation, if this has not yet been undertaken. Usually, a second agent will need to be added.      This Document is signed by Pernell Alba MD, FAAN, FAASM   December 21, 20179:48 PM

## 2018-06-29 ENCOUNTER — OFFICE VISIT (OUTPATIENT)
Dept: NEUROLOGY | Facility: CLINIC | Age: 83
End: 2018-06-29

## 2018-06-29 VITALS
OXYGEN SATURATION: 96 % | DIASTOLIC BLOOD PRESSURE: 64 MMHG | SYSTOLIC BLOOD PRESSURE: 130 MMHG | HEIGHT: 64 IN | HEART RATE: 71 BPM

## 2018-06-29 DIAGNOSIS — R47.02 DYSPHASIA: ICD-10-CM

## 2018-06-29 DIAGNOSIS — F48.2 PBA (PSEUDOBULBAR AFFECT): Primary | ICD-10-CM

## 2018-06-29 DIAGNOSIS — I69.30 SEQUELAE, POST-STROKE: ICD-10-CM

## 2018-06-29 DIAGNOSIS — R41.3 MEMORY LOSS: ICD-10-CM

## 2018-06-29 DIAGNOSIS — R41.0 CONFUSION: ICD-10-CM

## 2018-06-29 DIAGNOSIS — R45.1 AGITATION: ICD-10-CM

## 2018-06-29 PROCEDURE — 99214 OFFICE O/P EST MOD 30 MIN: CPT | Performed by: PSYCHIATRY & NEUROLOGY

## 2018-06-29 RX ORDER — ALENDRONATE SODIUM 70 MG/1
1 TABLET ORAL WEEKLY
COMMUNITY
Start: 2018-06-14 | End: 2019-07-15

## 2018-06-29 RX ORDER — HYDROXYZINE HYDROCHLORIDE 25 MG/1
TABLET, FILM COATED ORAL
COMMUNITY
Start: 2018-06-18 | End: 2020-04-17

## 2018-06-29 NOTE — PROGRESS NOTES
[facility] NEUROLOGY [place] PROGRESS NOTE  History of Present Illness     Date: 6/29/2018    Patient Identification  Tanvi Obregon is a 83 y.o. female.    Patient information was obtained from patient.  History/Exam limitations: none.    Original consultation requested by: Kenton Wagoner MD      Chief Complaint   Follow-up (Pt in office today for 6 month follow up. Sequelae, post-stroke ) and Memory Loss (Pt family concerned that pt is more forgetful, getting upset about things missing that were never there in the first place. Pt is easily upset )      History of Present Illness   Patient has history of seizures, but has not had any episode recently. She does not have any complaints about her medication. She has a hard time following verbal commands, but responds well to visual aids. She has some word substitution but usually is able to communicate what she intends. She has no loss of fluency or vocabulary. Her son notes that she does have some changes in mood, including agitation and sudden mood fluctuations. He also notes increased confusion and forgetfulness.     PMH:   Past Medical History:   Diagnosis Date   • Abnormal vaginal bleeding    • Disease of thyroid gland    • Hypertension    • SOB (shortness of breath)    • Stroke        Past Surgical History: No past surgical history on file.    Family Hisotry: No family history on file.    Social History:   Social History     Social History   • Marital status:      Spouse name: N/A   • Number of children: N/A   • Years of education: N/A     Occupational History   • Not on file.     Social History Main Topics   • Smoking status: Former Smoker   • Smokeless tobacco: Never Used   • Alcohol use No   • Drug use: No   • Sexual activity: Not on file     Other Topics Concern   • Not on file     Social History Narrative   • No narrative on file       Medications:   Current Outpatient Prescriptions   Medication Sig Dispense Refill   • alendronate  (FOSAMAX) 70 MG tablet 1 tablet 1 (One) Time Per Week.     • aspirin  MG tablet Take 1 tablet by mouth daily. 100 tablet 2   • atorvastatin (LIPITOR) 40 MG tablet Take 1 tablet by mouth Daily. 90 tablet 3   • cholecalciferol (VITAMIN D3) 1000 units tablet Take 1,000 Units by mouth Daily.     • hydrOXYzine (ATARAX) 25 MG tablet      • levETIRAcetam (KEPPRA) 500 MG tablet Take 1 tablet by mouth Daily. (Patient taking differently: Take 250 mg by mouth Every 12 (Twelve) Hours.) 30 tablet 11   • levothyroxine (SYNTHROID, LEVOTHROID) 75 MCG tablet Take 1 tablet by mouth Daily. 90 tablet 3   • lisinopril (PRINIVIL,ZESTRIL) 5 MG tablet 1 tablet Daily.     • omeprazole (priLOSEC) 20 MG capsule Take 1 capsule by mouth Daily. 90 capsule 3     No current facility-administered medications for this visit.        Allergy: No Known Allergies    Review of Systems:  Review of Systems   Constitutional: Negative for chills and fever.   HENT: Negative for congestion, ear pain, hearing loss, rhinorrhea and sore throat.    Eyes: Negative for pain, discharge and redness.   Respiratory: Negative for cough, shortness of breath, wheezing and stridor.    Cardiovascular: Negative for chest pain, palpitations and leg swelling.   Gastrointestinal: Negative for abdominal pain, constipation, nausea and vomiting.   Endocrine: Negative for cold intolerance, heat intolerance and polyphagia.   Genitourinary: Negative for dysuria, flank pain, frequency and urgency.   Musculoskeletal: Positive for gait problem. Negative for joint swelling, myalgias, neck pain and neck stiffness.   Skin: Negative for pallor, rash and wound.   Allergic/Immunologic: Negative for environmental allergies.   Neurological: Positive for speech difficulty. Negative for dizziness, tremors, seizures, syncope, facial asymmetry, weakness, light-headedness, numbness and headaches.   Hematological: Negative for adenopathy.   Psychiatric/Behavioral: Positive for agitation,  "behavioral problems, confusion, decreased concentration and dysphoric mood. Negative for hallucinations. The patient is not nervous/anxious.        Physical Exam     Vitals:    06/29/18 1107   BP: 130/64   Pulse: 71   SpO2: 96%   Height: 162.6 cm (64\")     GENERAL: Patient is pleasant, cooperative, appears to be stated age.  Body habitus is endomorphic.  SKIN AND EXTREMITIES:  No skin rashes or lesions are noted.  No cyanosis, clubbing or edema of the extremities.    HEAD:  Head is normocephalic and atraumatic.    NECK: Neck are non-tender without thyromegaly or adenopathy.  Carotic upstrokes are 1+/4.  No cranial or cervical bruits.  The neck is supple with a full range of motion.   ENT: palate elevate symmetrically, no evidence of high arch palate, tongue midline erythema in posterior pharynx, Mallampati Classification Class III   CARDIOVASCULAR:  Regular rate and rhythm with normal S1 and S2 without rub or gallop.  RESPIRATORY:  Clear to auscultation without wheezes or crackle   ABDOMEN:  Soft and non-tender, positive bowel sound without hepatosplenomegaly  BACK:  Back is straight without midline defect.    PSYCH:  Higher cortical function/mental status:  The patient is alert.  She is oriented x3 to time, place and person.  Recent and the remote memory appear normal.  The patient has a good fund of knowledge.  There is no visual or auditory hallucination or suicidal or homicidal ideation.  SPEECH:There is no gross evidence of aphasia, dysarthria or agnosia.      CRANIAL NERVES:  Pupils are 4mm, equal round reactive to light, reacting briskly to 2mm without afferent pupillary defect.  Visual fields are intact to confrontation testing.  Fundoscopic examination reveals sharp disk margins with normal vasculature.  No papilledema, hemorrhages or exudates.  Extraocular movements are full and smooth with normal pursuits and saccades.  No nystagmus noted.  The face is symmetric. palate elevate symmetrically, Tongue " midline, positive gag reflex. The remainder of the cranial nerves are intact and symmetrical.    MOTOR: Strength is 5/5 throughout with normal tone and bulk with the following exceptions, 4/5 intrinsic muscles of the hands and feet.  No involuntary movements noted.    Deep Tendon Reflexes: are 2/4 and symmetrical in the upper extremities, 2/4 and symmetrical at the knees and 1/4 and symmetrical at the Achilles tendon.  Plantar responses were down-going bilaterally.    SENSATION:  Intact to pinprick, light touch, vibration and proprioception.  Coordination:  The patient normally performs finger-nose-finger, heel-to-knee-to-shin and rapid alternating movements in symmetrical fashion.    COORDINATION AND GAIT:  The patient walks with a narrow-based gait.  Patient is able to heel-toe and tandem walk forward and backwards without difficulty.  Romberg and monopedal  Romberg are negative.    MUSCULOSKELETAL: Range of motion normal, no clubbing, cyanosis, or edema.  No joint swelling.            Records Reviewed: I have personally reviewed her previous medical record.    Tanvi was seen today for follow-up and memory loss.    Diagnoses and all orders for this visit:    PBA (pseudobulbar affect)    Sequelae, post-stroke    Dysphasia    Confusion    Agitation    Memory loss        Treatments:  1.  Continue patient on Keppra 500 mg a day twice a day for seizure prophylaxis  Discussion:  The most common cause of stroke is an embolus that occludes an artery in the brain. This usually arises from the carotid arteries or from the vertebral-basilar arteries. Another common cause is from a thrombus that has originated from the left atrium of the heart due to atrial fibrillation. Other reasons include excessive narrowing of large vessels resulting from an atherosclerotic plaque and increased blood viscosity caused by hypercoagulable state. Stroke is related to other medical conditions such as hypertension, heart disease  (especially atrial fibrillation , migraine, hypercholesterolemia, and diabetes mellitus .  Risk factors for Stroke include  · Family history of stroke substantially increases risk.  · People 55 years or older are at higher risk.  · Males have a slightly higher risk of stroke than females but females are more likely to die from a stroke.  · High blood pressure  · Diabetes Mellitus  ·  Americans generally tend to have a high risk of dying from a stroke, chiefly due to high blood pressure and uncontrolled diabetes.  · Cigarette smoking  The mainstay of treatment following acute recovery from a stroke depends on the underlying cause. It is not always immediately possible to tell the difference between a CVA and a TIA. A TIA can be considered as the last warning. The reason for the condition should be immediately examined by imaging of the brain.   · To reduce risk of recurrence, patients are recommended to undergo lifestyle changes including quitting smoking, losing weight, eating more fruits and vegetables and exercising regularly  · The use of anti-coagulant  medications, heparin  and warfarin, or anti-platelet medications such as aspirin may be warranted. The initial treatment is aspirin, second line is clopidogrel (PLAVIX), third line is ticlopidine. If stroke is recurrent after aspirin treatment, the combination of aspirin and dipyridamole is needed (Aggrenox).      This Document is signed by Pernell Alba MD, FAAN, FAASM   June 29, 20186:51 PM

## 2018-07-06 ENCOUNTER — TELEPHONE (OUTPATIENT)
Dept: NEUROLOGY | Facility: CLINIC | Age: 83
End: 2018-07-06

## 2018-07-06 NOTE — TELEPHONE ENCOUNTER
Patients  called and stated that since the patients appt they have been waiting on a medication for PBA, and the pharmacy never received it. The medication is not in the patients chart, would you please reorder the medication and send to patients pharmacy?

## 2019-05-03 RX ORDER — LEVOTHYROXINE SODIUM 0.07 MG/1
TABLET ORAL
Qty: 90 TABLET | Refills: 2 | Status: SHIPPED | OUTPATIENT
Start: 2019-05-03 | End: 2020-04-17

## 2019-07-15 ENCOUNTER — OFFICE VISIT (OUTPATIENT)
Dept: NEUROLOGY | Facility: CLINIC | Age: 84
End: 2019-07-15

## 2019-07-15 ENCOUNTER — LAB REQUISITION (OUTPATIENT)
Dept: LAB | Facility: HOSPITAL | Age: 84
End: 2019-07-15

## 2019-07-15 VITALS
SYSTOLIC BLOOD PRESSURE: 134 MMHG | WEIGHT: 117 LBS | HEIGHT: 65 IN | DIASTOLIC BLOOD PRESSURE: 78 MMHG | OXYGEN SATURATION: 99 % | BODY MASS INDEX: 19.49 KG/M2 | HEART RATE: 68 BPM

## 2019-07-15 DIAGNOSIS — E07.9 DISORDER OF THYROID: ICD-10-CM

## 2019-07-15 DIAGNOSIS — R41.89 COGNITIVE IMPAIRMENT: ICD-10-CM

## 2019-07-15 DIAGNOSIS — G40.209 PARTIAL SYMPTOMATIC EPILEPSY WITH COMPLEX PARTIAL SEIZURES, NOT INTRACTABLE, WITHOUT STATUS EPILEPTICUS (HCC): Primary | ICD-10-CM

## 2019-07-15 DIAGNOSIS — Z00.00 ROUTINE GENERAL MEDICAL EXAMINATION AT A HEALTH CARE FACILITY: ICD-10-CM

## 2019-07-15 DIAGNOSIS — R47.01 EXPRESSIVE APHASIA: ICD-10-CM

## 2019-07-15 PROCEDURE — 99215 OFFICE O/P EST HI 40 MIN: CPT | Performed by: NURSE PRACTITIONER

## 2019-07-15 PROCEDURE — 36415 COLL VENOUS BLD VENIPUNCTURE: CPT | Performed by: NURSE PRACTITIONER

## 2019-07-15 RX ORDER — DONEPEZIL HYDROCHLORIDE 5 MG/1
5 TABLET, FILM COATED ORAL 2 TIMES DAILY
COMMUNITY
End: 2020-04-17

## 2019-07-15 NOTE — PROGRESS NOTES
Subjective:     Patient ID: Tanvi Obregon is a 84 y.o. female.    CC:   Chief Complaint   Patient presents with   • Dementia       HPI:   History of Present Illness     Ms Obregon is a 84-year-old patient who was previously followed by Dr. Alba following the left MCA stroke in 2016.  She presented to the hospital around that time with aphasia, she was found to have a stroke, she did have intervention with Dr. kiser.  After recovery from her stroke she was found to have seizures, she has been on Keppra for a couple years at current dose of 250 mg BID.  She is here today with her son with whom she lives with.  He tells me that she has had problems with expressive aphasia since her stroke, she did complete extensive rehab with physical therapy, occupational therapy as well as speech therapy at [facility].  She continues to have problems with aphasia, this is unchanged since the time of her stroke.  He also reports that she has problems with her memory and irritability since the stroke.  He tells me that over the past year her memory seems to have declined, he reports that she has become a bit defiant.  She reportedly tells him that she does not want to live with him and his wife, she is not happy with anything they seem to offer her in regards to food or recreation.  He tells me that he has 1 brother and sister-in-law who are unable to care for her as they both work and flexible schedules.  At this point his wife is home with her during the day, patient is not left alone.  He reports that she has not had any aggressive episodes however she does refuse to eat, she refused to wear depends (she has problems with occasional bladder leakage only in parentheses.  They have to make near daily phone calls to both of her brothers for them to tell her daily that they were unable to care for her as well.  He tells me that she does seem to get upset, she threatens to leave the home, [place] neighbors.  She did want to leave the home and go out to the yard and came straight back.  Otherwise she has not had any problems with wandering.  She does not drive.  She gets very upset when they tell her she will be unable to live alone.  He has noticed in the past couple months that she is doing odd behaviors such as placing a fork and electrical socket when she was trying to plug in appliance, she also put her hand in the toilet when she was trying to flush it.  He reports that she does odd behaviors like this several times a day, this started only a few months ago.  He denies visual or auditory hallucination.  She does continue to self-care in regards to dressing herself, she dresses herself appropriately however at times when she gets ready for [employer] are closed and not match.  She does not cook, family has to remind her to eat most of the time.  They report that she most often will shower on her own however she does at times also need prompting for bathing.  They expressed to me some frustrations that they have mentioned this to the prior neurologist over the past year who had no other suggestion.  Recently back in May primary care started patient on donepezil 5 mg daily with instruction to increase to 5 mg twice daily.  Son reports he feels as if her odd behavior started after the initiation of the donepezil. She has been checked for infection/UTI  She has reported hx of hypothryoid however she is not currently on medication, son is unsure why this was discontinued  She has not had any recent illness  The following portions of the patient's history were reviewed and updated as appropriate: allergies, current medications, past family history, past medical history, past social history, past surgical history and problem list.    Past Medical History:   Diagnosis Date   • Abnormal vaginal bleeding    • Disease of thyroid gland    • Hypertension    • SOB (shortness of breath)    • Stroke  (CMS/[facility])        History reviewed. No pertinent surgical history.    Social History     Socioeconomic History   • Marital status:      Spouse name: Not on file   • Number of children: Not on file   • Years of education: Not on file   • Highest education level: Not on file   Tobacco Use   • Smoking status: Former Smoker   • Smokeless tobacco: Never Used   Substance and Sexual Activity   • Alcohol use: No   • Drug use: No       History reviewed. No pertinent family history.     Review of Systems   Constitutional: Negative.    HENT: Negative for drooling, hearing loss, tinnitus, trouble swallowing and voice change.    Eyes: Negative.    Respiratory: Negative.    Cardiovascular: Negative.    Gastrointestinal: Negative.    Endocrine: Negative.    Genitourinary: Negative.    Musculoskeletal: Negative.    Skin: Negative.    Allergic/Immunologic: Negative.    Neurological: Positive for speech difficulty. Negative for dizziness, tremors, seizures, syncope, facial asymmetry, weakness, light-headedness, numbness and headaches.   Hematological: Negative.    Psychiatric/Behavioral: Positive for agitation, behavioral problems and confusion. Negative for sleep disturbance.        Objective:    Neurologic Exam     Mental Status   Level of consciousness: alert  Patient is alert, she does follow some commands however when asked to do finger-to-nose testing she paces her finger in her mouth.  We would have attempted to do MMSE but due to her expressive aphasia from her prior stroke in 2016 she is unable to answer questions appropriately  I did ask her to take a piece of paper out of my hand, folded in half and then again folded in half, placed on the floor and  and she did so with no problem.  She follow command to stand turn around and sit back in chair.  Otherwise she did follow other commands appropriatey       Cranial Nerves   Cranial nerves II through XII intact.     CN III, IV, VI   Pupils are equal, round, and  reactive to light.  Extraocular motions are normal.   Right pupil: Size: 2 mm. Shape: regular. Accommodation: intact.   Left pupil: Size: 2 mm. Shape: regular. Accommodation: intact.   Nystagmus: none     CN V   Facial sensation intact.     CN VII   Facial expression full, symmetric.     CN VIII   CN VIII normal.     CN IX, X   CN IX normal.   CN X normal.     CN XI   CN XI normal.     Motor Exam   Muscle bulk: normal  Overall muscle tone: normal    Strength   Strength 5/5 throughout.     Sensory Exam   Light touch normal.     Gait, Coordination, and Reflexes     Gait  Gait: normal    Coordination   Romberg: negative  Heel to shin coordination: normal  Tandem walking coordination: normal    Tremor   Resting tremor: absent  Intention tremor: absent  Action tremor: absent    Reflexes   Reflexes 2+ except as noted.   Right plantar: normal  Left plantar: normal      Physical Exam   Constitutional: She appears well-developed and well-nourished. No distress.   HENT:   Head: Normocephalic and atraumatic.   Eyes: Conjunctivae and EOM are normal. Pupils are equal, round, and reactive to light. No scleral icterus.   Neck: Normal range of motion. Neck supple.   Cardiovascular: Normal rate.   Pulmonary/Chest: Effort normal. No respiratory distress.   Neurological: She is alert. She has normal strength. She has a normal Heel to Shin Test, a normal Romberg Test and a normal Tandem Gait Test. Gait normal.   Skin: Skin is warm.   Vitals reviewed.      Assessment/Plan:       Tanvi was seen today for dementia.    Diagnoses and all orders for this visit:    Partial symptomatic epilepsy with complex partial seizures, not intractable, without status epilepticus (CMS/HCC)  -     MRI Brain Without Contrast  -     Levetiracetam Level (Keppra); Future  -     EEG Awake or Drowsy Routine    Expressive aphasia    Cognitive impairment  -     Vitamin B12; Future  -     Folate; Future  -     Methylmalonic Acid, Serum; Future  -     RPR  -      TSH; Future  -     MRI Brain Without Contrast  -     EEG Awake or Drowsy Routine    Disorder of thyroid   -     TSH; Future    Ms. Obregon has been struggling with severe expressive aphasia since her stroke in 2016.,  Unfortunately she did develop seizures after the stroke, she is on a low-dose of Keppra and her son reports that she has had no obvious strokes in a couple years.  He has noticed a problem in her memory in the past year to year and a half however in the past few months she has had some behavioral problems, odd behaviors such as sticking her hand in the toilet, using a fork inappropriately.  There are several other instances in which he is concerned.  I would like to get an EEG to rule out seizure activity that may be contributing to these odd behaviors however it is most likely consistent with dementia.  He does report that her symptoms seem to worsen after she was recently started on donepezil in May; I have asked son to stop this for now.  I would like to get MRI brain to rule out new stroke  Labs pending as noted above  Will see her back in 3 weeks, I do think she and her family would benefit from referral [facility], will discuss at follow up.  Reviewed medications, potential side effects and signs and symptoms to report. Discussed risk versus benefits of treatment plan with patient and/or family-including medications, labs and radiology that may be ordered. Addressed questions and concerns during visit. Patient and/or family verbalized understanding and agree with plan.  Discussed signs and symptoms of stroke and when to call 911. Instructed to follow a low fat diet including the Mediterranean diet. Instructed to take all medications daily as prescribed. Encouraged 30 minutes of exercise 3-4 times a week as tolerated. Stay well hydrated. Discussed potential side effects of new medications and signs and symptoms to report. If patient is currently using tobacco products,  smoking cessation was encouraged. Reviewed stroke risk factors and stroke prevention plan. Patient and/or family verbalizes understanding and agrees with plan.   Patient instructions include: No driving or operating heavy machinery for 3 months from onset of most recent seizure. Minimize stress as much as possible. Recommended 7-8 hours of sleep each night. Abstain from alcohol intake. Educated on Antiepileptic medications with possible side effects and signs and symptoms to report if prescribed during visit. Instructed to take seizure medication daily if prescribed. Reviewed potential seizure risk factors. Instructed to call 911 or our office if another seizure does occur.  EMR Dragon/Transcription Disclaimer:  Much of this encounter note is an electronic transcription of spoken language to printed text. Electronic transcription of spoken language may permit erroneous words or phrases to be inadvertently transcribed. Although I have reviewed the note for such errors, some may still exist in this documentation.  Total time of visit face-to-face 60 minutes with greater than 45 minutes spent discussion and counseling with son on upcoming testing, possible etiology of her complaints and treatment plan.         Aislinn Blount, APRN  7/15/2019

## 2019-07-18 LAB
FOLATE SERPL-MCNC: 18.9 NG/ML
LEVETIRACETAM SERPL-MCNC: 13.4 UG/ML (ref 10–40)
Lab: NORMAL
METHYLMALONATE SERPL-SCNC: 197 NMOL/L (ref 0–378)
RPR SER QL: NON REACTIVE
TSH SERPL DL<=0.005 MIU/L-ACNC: 1.51 UIU/ML (ref 0.45–4.5)
VIT B12 SERPL-MCNC: 546 PG/ML (ref 232–1245)

## 2019-07-20 ENCOUNTER — RESULTS ENCOUNTER (OUTPATIENT)
Dept: NEUROLOGY | Facility: CLINIC | Age: 84
End: 2019-07-20

## 2019-07-20 DIAGNOSIS — R41.89 COGNITIVE IMPAIRMENT: ICD-10-CM

## 2019-07-20 DIAGNOSIS — E07.9 DISORDER OF THYROID: ICD-10-CM

## 2019-07-20 DIAGNOSIS — G40.209 PARTIAL SYMPTOMATIC EPILEPSY WITH COMPLEX PARTIAL SEIZURES, NOT INTRACTABLE, WITHOUT STATUS EPILEPTICUS (HCC): ICD-10-CM

## 2019-07-23 ENCOUNTER — HOSPITAL ENCOUNTER (OUTPATIENT)
Dept: MRI IMAGING | Facility: HOSPITAL | Age: 84
Discharge: HOME | End: 2019-07-23
Admitting: NURSE PRACTITIONER

## 2019-07-23 PROCEDURE — 70551 MRI BRAIN STEM W/O DYE: CPT

## 2019-07-24 NOTE — PROGRESS NOTES
Please let Ms. Obregon's son know that her brain MRI shows evidence of her old stroke, shows some mild scarring which all appears similar and stable from her MRI in 2016.  There were no apparent new findings, I have reviewed her new images however for some reason her older 2016 MRI is a report only was no associated images, radiologist did note that her MRI was stable from previous in 2016.  There was no evidence of new stroke

## 2019-08-07 ENCOUNTER — OFFICE VISIT (OUTPATIENT)
Dept: NEUROLOGY | Facility: CLINIC | Age: 84
End: 2019-08-07

## 2019-08-07 VITALS
WEIGHT: 123 LBS | BODY MASS INDEX: 20.49 KG/M2 | SYSTOLIC BLOOD PRESSURE: 138 MMHG | HEART RATE: 69 BPM | OXYGEN SATURATION: 97 % | HEIGHT: 65 IN | DIASTOLIC BLOOD PRESSURE: 78 MMHG

## 2019-08-07 DIAGNOSIS — R41.3 MEMORY LOSS: ICD-10-CM

## 2019-08-07 DIAGNOSIS — R47.02 EXPRESSIVE DYSPHASIA: Primary | ICD-10-CM

## 2019-08-07 PROCEDURE — 99213 OFFICE O/P EST LOW 20 MIN: CPT | Performed by: NURSE PRACTITIONER

## 2019-08-07 NOTE — PROGRESS NOTES
"Subjective:     Patient ID: Tanvi Obregon is a 84 y.o. female.    CC:   Chief Complaint   Patient presents with   • Seizures       HPI:   History of Present Illness     Ms Obregon is here today for follow up.  When I first saw her she was here to establish care within our clinic.  She was previously followed by Dr. Alba following the left MCA stroke in 2016.  She presented to the hospital around that time with aphasia, she was found to have a stroke, she did have intervention with Dr. Zarate.  After recovery from her stroke she was found to have seizures, she has been on Keppra for a couple years at current dose of 250 mg BID.  She initially came in with her son with whom she lives with.  He told me that she has had problems with expressive aphasia since her stroke, she did complete extensive rehab with physical therapy, occupational therapy as well as speech therapy at [facility].  She continues to have problems with aphasia, this is unchanged since the time of her stroke, he \"words come out wrong\".  He also reported that she has problems with her memory and irritability since the stroke.  He told me that over the past year her memory seems to have declined, he reported that she has become a bit defiant.  She reportedly tells him that she does not want to live with him and his wife, she is not happy with anything they seem to offer her in regards to food or recreation.  He told me that he has 1 brother and sister-in-law who are unable to care for her as they both work and flexible schedules.  At this point his wife is home with her during the day, patient is not left alone.  He reported that she has not had any aggressive episodes however she was refusing to eat at times, she refused to wear depends (she has problems with occasional bladder leakage only)  They have to make near daily phone calls to both of her brothers for them to tell her daily that they were unable to care for her as well.  He told me " that she does seem to get upset, she threatens to leave the home, [place] neighbors.  She did once leave the home and go out to the yard and came straight back.  Otherwise she has not had any problems with wandering.  She does not drive.  She gets very upset when they tell her she will be unable to live alone.  He had noticed in the past couple months that she was doing odd behaviors such as placing a fork and electrical socket when she was trying to plug in appliance, she also put her hand in the toilet when she was trying to flush it.  He reported that she does odd behaviors like this several times a day, this started only a few months ago.  He denies visual or auditory hallucination.  She does continue to self-care in regards to dressing herself, she dresses herself appropriately however at times when she gets ready for Jewish are closed and not match.  She does not cook, family has to remind her to eat most of the time.  They report that she most often will shower on her own however she does at times also need prompting for bathing.  They expressed to me some frustrations that they have mentioned this to the prior neurologist over the past year who had no other suggestion.  Recently back in May primary care started patient on donepezil 5 mg daily with instruction to increase to 5 mg twice daily.  Son reported he felt as if her odd behavior started after the initiation of the donepezil. She had been checked for infection/UTI  She has reported hx of hypothryoid however she is not currently on medication, son is unsure why this was discontinued  She has not had any recent illness  I last visit I did order EEG, this is scheduled for 8/20/2019.  She continues now on Keppra 250 mg twice daily which she has been on for the past 3 years.  The son was concerned as he felt her erratic behavior started after the initiation of donepezil, at last visit I did instruct him to  stop this medication when she is done.  He admits to me today that her odd behaviors have resolved, she does continue to have some irritability.  Her case is a bit complicated by the fact that she has severe expressive aphasia, she can sometimes get out what she wants to say otherwise her speech consists of word salad.  Son would like to explore another round of speech therapy to see if this could help her at all    The following portions of the patient's history were reviewed and updated as appropriate: allergies, current medications, past family history, past medical history, past social history, past surgical history and problem list.    Past Medical History:   Diagnosis Date   • Abnormal vaginal bleeding    • Disease of thyroid gland    • Hypertension    • SOB (shortness of breath)    • Stroke (CMS/HCC)        No past surgical history on file.    Social History     Socioeconomic History   • Marital status:      Spouse name: Not on file   • Number of children: Not on file   • Years of education: Not on file   • Highest education level: Not on file   Tobacco Use   • Smoking status: Former Smoker   • Smokeless tobacco: Never Used   Substance and Sexual Activity   • Alcohol use: No   • Drug use: No       No family history on file.     Review of Systems   Constitutional: Negative.    HENT: Negative.    Eyes: Negative.    Respiratory: Negative.    Cardiovascular: Negative.    Gastrointestinal: Negative.    Endocrine: Negative.    Genitourinary: Negative.    Musculoskeletal: Negative.    Skin: Negative.    Neurological: Positive for speech difficulty (expressive aphasia). Negative for dizziness, tremors, seizures, syncope, facial asymmetry, weakness, light-headedness, numbness and headaches.   Psychiatric/Behavioral: Positive for agitation (improved off donepezil).        Objective:    Neurologic Exam     Mental Status   Patient is alert, she follows most commands well, she does have expressive dysphasia which  has been present since stroke a couple years  prior     Cranial Nerves   Cranial nerves II through XII intact.     CN III, IV, VI   Extraocular motions are normal.     Motor Exam   Muscle bulk: normal  Overall muscle tone: normal  Right arm pronator drift: absent  Left arm pronator drift: absent    Strength   Strength 5/5 throughout.     Sensory Exam   Light touch normal.     Gait, Coordination, and Reflexes     Gait  Gait: normal    Coordination   Romberg: negative  Finger-nose-finger test: She has difficulty understanding prompts for finger-nose testing, when asked to touch her nose she puts her finger in her mouth, she is unable to then touch my finger on command.    Tremor   Resting tremor: absent  Intention tremor: absent  Action tremor: absent    Reflexes   Right brachioradialis: 1+  Left brachioradialis: 1+  Right biceps: 1+  Left biceps: 1+  Right triceps: 1+  Left triceps: 1+  Right patellar: 2+  Left patellar: 2+  Right achilles: 2+  Left achilles: 2+      Physical Exam   Constitutional: She appears well-developed and well-nourished. No distress.   HENT:   Head: Normocephalic and atraumatic.   Eyes: EOM are normal.   Neck: Normal range of motion.   Pulmonary/Chest: Effort normal.   Neurological: She is alert. She has normal strength. She has a normal Romberg Test. Finger-nose-finger test: She has difficulty understanding prompts for finger-nose testing, when asked to touch her nose she puts her finger in her mouth, she is unable to then touch my finger on command. Gait normal.   Reflex Scores:       Tricep reflexes are 1+ on the right side and 1+ on the left side.       Bicep reflexes are 1+ on the right side and 1+ on the left side.       Brachioradialis reflexes are 1+ on the right side and 1+ on the left side.       Patellar reflexes are 2+ on the right side and 2+ on the left side.       Achilles reflexes are 2+ on the right side and 2+ on the left side.  Skin: Skin is warm.   Vitals  reviewed.      Assessment/Plan:       Tanvi was seen today for seizures.    Diagnoses and all orders for this visit:    Expressive dysphasia  -     Ambulatory Referral to Speech Therapy    Memory loss  -     Ambulatory Referral to Speech Therapy    Ms. Obregon's symptoms are complicated due to expressive dysphasia from prior CVA.  It is somewhat difficult to obtain accurate MMSE testing due to her speech dysfunction.  She did have seizure post CVA, she has been on Keppra for quite some time.  Son had noticed some behavioral changes so last visit I did schedule EEG,upcoming EEG 8/20/2019, no change in Keppra dose at this time.  Son reports significant improvement in her overall irritation and agitation after stopping donepezil.  They would like to try speech therapy before starting any other medications which I think is acceptable.  They are having some trouble with some irritation and agitation, she seems to be a bit defiant against her son's wife, she does live with him.  I discussed referring her to the memory care clinic which her son is open to.  Appointment made with Kinza Pond PA-C.  Reviewed medications, potential side effects and signs and symptoms to report. Discussed risk versus benefits of treatment plan with patient and/or family-including medications, labs and radiology that may be ordered. Addressed questions and concerns during visit. Patient and/or family verbalized understanding and agree with plan.  EMR Dragon/Transcription Disclaimer:  Much of this encounter note is an electronic transcription of spoken language to printed text. Electronic transcription of spoken language may permit erroneous words or phrases to be inadvertently transcribed. Although I have reviewed the note for such errors, some may still exist in this documentation.             Aislinn Blount, APRN  8/13/2019

## 2019-08-16 ENCOUNTER — TELEPHONE (OUTPATIENT)
Dept: NEUROLOGY | Facility: CLINIC | Age: 84
End: 2019-08-16

## 2019-08-16 NOTE — TELEPHONE ENCOUNTER
[facility] called to let us know that they would like to do therapy once weekly for nine weeks.    Family also requested OT.  She would also like to eval for that as well.

## 2019-08-20 ENCOUNTER — HOSPITAL ENCOUNTER (OUTPATIENT)
Dept: NEUROLOGY | Facility: HOSPITAL | Age: 84
Discharge: HOME | End: 2019-08-20
Admitting: NURSE PRACTITIONER

## 2019-08-20 PROCEDURE — 95819 EEG AWAKE AND ASLEEP: CPT

## 2019-08-22 NOTE — TELEPHONE ENCOUNTER
I spoke to family today.  [facility] said that there isn't anything they can offer based off the evaluation.  Family call us if they need anything.

## 2019-09-18 ENCOUNTER — TELEPHONE (OUTPATIENT)
Dept: NEUROLOGY | Facility: CLINIC | Age: 84
End: 2019-09-18

## 2019-09-18 NOTE — TELEPHONE ENCOUNTER
Nunu Tom is a  that has been working with her and the family.  Mrs. Obregon has had some good days recently and they feel like it would be ok to stop her services.  Nunu feels like she has given the family plenty of resources and they will be fine.

## 2019-10-02 ENCOUNTER — TELEPHONE (OUTPATIENT)
Dept: NEUROLOGY | Facility: CLINIC | Age: 84
End: 2019-10-02

## 2019-10-02 ENCOUNTER — OFFICE VISIT (OUTPATIENT)
Dept: NEUROLOGY | Facility: CLINIC | Age: 84
End: 2019-10-02

## 2019-10-02 VITALS
HEIGHT: 65 IN | DIASTOLIC BLOOD PRESSURE: 76 MMHG | SYSTOLIC BLOOD PRESSURE: 128 MMHG | BODY MASS INDEX: 20.33 KG/M2 | WEIGHT: 122 LBS

## 2019-10-02 DIAGNOSIS — F03.90 DEMENTIA WITHOUT BEHAVIORAL DISTURBANCE, UNSPECIFIED DEMENTIA TYPE: ICD-10-CM

## 2019-10-02 DIAGNOSIS — G40.909 SEIZURE DISORDER (HCC): ICD-10-CM

## 2019-10-02 DIAGNOSIS — I69.30 SEQUELAE, POST-STROKE: Primary | ICD-10-CM

## 2019-10-02 PROCEDURE — 99215 OFFICE O/P EST HI 40 MIN: CPT | Performed by: PHYSICIAN ASSISTANT

## 2019-10-02 RX ORDER — QUETIAPINE FUMARATE 25 MG/1
25 TABLET, FILM COATED ORAL NIGHTLY
Qty: 30 TABLET | Refills: 11 | Status: SHIPPED | OUTPATIENT
Start: 2019-10-02

## 2019-10-02 NOTE — TELEPHONE ENCOUNTER
Nunu Tom with [facility] called stating there may be some confusion as she has been transferred several times. Apologized for this as it seems the patient had been seen by Aislinn Blount previously, but will be seeing DEMI Dodson today for the first time and inquired about what she is needing.      Nunu Echols states she is a  whom after speaking with the patient and patient's family is needing a written order (possibly just on a prescription pad?) to omit a social work visit during the week of 9/29/19. MSW to visit week of 10/6/19 for community resource to patient and family.     She would like this order faxed to her  Fx:[phone]

## 2019-10-02 NOTE — TELEPHONE ENCOUNTER
[facility] to get a verbal authorization to change pt scheduled appt. Spoke with DEMI Dodson assistant Kimberly to verify authorization to give verbal order. Gave Loreta verbal authorization to change pt appt. Thanks.

## 2019-10-03 NOTE — PROGRESS NOTES
"Subjective     Chief Complaint: dementia      History of Present Illness   Tanvi Obregon is a 84 y.o. female who comes to clinic today for evaluation of Dementia . She was previously followed by Dr. Alba and ALEXANDRIA Shannon. Her family intially noted symptoms following a left MCA stroke in 2016 marked by aphasia and memory loss. This has worsened  over time. Additional symptoms have included impairments in essentially all spheres of cognition. There have been associated  symptoms of agitation and irritability. Her family notes  impairments in ADL's. She is currently residing with her son in [facility].     She has a history of seizures for which she takes Keppra 250mg qAM and 500mg qHS. An EEG in 8/19 showed mild generalized slowing with intermittent left temporal slowing as well as left temporal sharp/slow wave discharges. Unfortunately, the history regarding her seizures is otherwise unclear. Her family denies any recent episodes of seizure like activity.     Prior evaluation has included screening blood work  which was unremarkable . An MRI of the brain in 7/19 showed chronic small vessel ischemic changes in addition to the old infarct in the left parieto-occipital region. She previously took donepezil, though this has been discontinued for unclear reasons.        I have reviewed and confirmed the past family, social and medical history as accurate on 10/2/19.     Review of Systems   Unable to perform ROS: Dementia       Objective     /76   Ht 165.1 cm (65\")   Wt 55.3 kg (122 lb)   BMI 20.30 kg/m²     General appearance today is normal.       Physical Exam   Neurological: She has normal strength. Gait normal.   Reflex Scores:       Bicep reflexes are 2+ on the right side and 1+ on the left side.       Brachioradialis reflexes are 2+ on the right side and 1+ on the left side.       Patellar reflexes are 2+ on the right side and 1+ on the left side.       Neurologic Exam     Mental " Status   Level of consciousness: alert  Abnormal comprehension.     Cranial Nerves   Cranial nerves II through XII intact.     Motor Exam   Muscle bulk: normal  Overall muscle tone: normal    Strength   Strength 5/5 throughout.     Sensory Exam   Light touch normal.     Gait, Coordination, and Reflexes     Gait  Gait: normal    Tremor   Resting tremor: absent    Reflexes   Right brachioradialis: 2+  Left brachioradialis: 1+  Right biceps: 2+  Left biceps: 1+  Right patellar: 2+  Left patellar: 1+        Results  MMSE=untestable       Assessment/Plan   Tanvi was seen today for seizures.    Diagnoses and all orders for this visit:    Sequelae, post-stroke    Seizure disorder (CMS/HCC)    Dementia without behavioral disturbance, unspecified dementia type (CMS/HCC)    Other orders  -     QUEtiapine (SEROquel) 25 MG tablet; Take 1 tablet by mouth Every Night.          Discussion/Summary   Tanvi Obregon comes to clinic today with a history of dementia, stroke, and seizures. This was discussed in detail with the patient and her family. Her workup has been complete and appropriate. Therefore, I do not have any further recommendations concerning this. After discussing potential treatment options, it was elected to add  Seroquel for her agitation. She will continue on ASA and Lipitor unchanged for secondary stroke prevention. She will also continue working closely with SLP through home health.2 She will then follow up in 6 months, or sooner if needed.   I spent 40 minutes face to face with the patient and family with 35 minutes spent on discussing diagnosis, prognosis, diagnostic testing, evaluation, current status, treatment options and management as discussed above.       As part of this visit I reviewed prior lab results, reviewed radiology results, reviewed radiology images and obtained additional history from the family which is incorporated in the HPI.      Kinza Pond PA-C

## 2019-10-07 ENCOUNTER — TELEPHONE (OUTPATIENT)
Dept: NEUROLOGY | Facility: CLINIC | Age: 84
End: 2019-10-07

## 2019-10-07 NOTE — TELEPHONE ENCOUNTER
----- Message from Ashlee Mims sent at 10/7/2019 10:19 AM EDT -----  Contact: Guido pt's son   Kinza,    Pt was given RX QUEtiapine (SEROquel) 25 MG and the insurance has rejected it. Guido called in regards as to what they need to do. Please call and advise.

## 2019-10-09 ENCOUNTER — PRIOR AUTHORIZATION (OUTPATIENT)
Dept: NEUROLOGY | Facility: CLINIC | Age: 84
End: 2019-10-09

## 2019-10-09 NOTE — TELEPHONE ENCOUNTER
Called Guido back to let him know I have completed the prior authorization and can take anywhere from 24 to 72 hours before hearing back but once approved it should be a lot cheaper!  Guido stated understanding.

## 2019-10-10 ENCOUNTER — TELEPHONE (OUTPATIENT)
Dept: NEUROLOGY | Facility: CLINIC | Age: 84
End: 2019-10-10

## 2019-10-10 NOTE — TELEPHONE ENCOUNTER
Loreta with speech therapy called asking for permission to codischarge with the .  She said this will allow them to speak freely about patient.

## 2020-04-17 ENCOUNTER — OFFICE VISIT (OUTPATIENT)
Dept: NEUROLOGY | Facility: CLINIC | Age: 85
End: 2020-04-17

## 2020-04-17 DIAGNOSIS — G40.209 PARTIAL SYMPTOMATIC EPILEPSY WITH COMPLEX PARTIAL SEIZURES, NOT INTRACTABLE, WITHOUT STATUS EPILEPTICUS (HCC): Primary | ICD-10-CM

## 2020-04-17 DIAGNOSIS — F03.91 DEMENTIA WITH BEHAVIORAL DISTURBANCE, UNSPECIFIED DEMENTIA TYPE: ICD-10-CM

## 2020-04-17 PROBLEM — F03.918 DEMENTIA WITH BEHAVIORAL DISTURBANCE (HCC): Status: ACTIVE | Noted: 2020-04-17

## 2020-04-17 PROCEDURE — 99213 OFFICE O/P EST LOW 20 MIN: CPT | Performed by: PSYCHIATRY & NEUROLOGY

## 2020-04-17 RX ORDER — RIVASTIGMINE 4.6 MG/24H
1 PATCH, EXTENDED RELEASE TRANSDERMAL DAILY
Qty: 30 PATCH | Refills: 11 | Status: SHIPPED | OUTPATIENT
Start: 2020-04-17 | End: 2021-04-17

## 2020-04-17 NOTE — PROGRESS NOTES
Subjective     Chief Complaint: dementia      History of Present Illness   Tanvi Obregon is a 85 y.o. female who returns today with a history of dementia. She was previously followed by Dr. Alba and ALEXANDRIA Shannon. Her family intially noted symptoms following a left MCA stroke in 2016 marked by aphasia and memory loss. This has worsened  over time. Additional symptoms have included impairments in essentially all spheres of cognition. There have been associated  symptoms of agitation and irritability. She is currently residing with her son in [place], [place].     She has a history of seizures for which she takes Keppra 250mg qAM and 500mg qHS. An EEG in 8/19 showed mild generalized slowing with intermittent left temporal slowing as well as left temporal sharp/slow wave discharges. Unfortunately, the history regarding her seizures is otherwise unclear.     Prior evaluation has included screening blood work  which was unremarkable . An MRI of the brain in 7/19 showed chronic small vessel ischemic changes in addition to a large area of encephalomalacia consistent with a prior left MCA infarct. She previously took donepezil, though this has been discontinued for unclear reasons.      Since her last visit on 10/2/19 her family has noted a modest decline cognitively, without changes in her interval history otherwise. She continues to have intermittent irritability, but this has been manageable. She is currently taking Seroquel with benefit. No further seizures have been reported.    I have reviewed and confirmed the past family, social and medical history as accurate on 4/17/20.     Review of Systems   Unable to perform ROS: Dementia       Objective     There were no vitals taken for this visit.      Physical Exam     Neurologic Exam      Results  MMSE=untestable       Assessment/Plan   Diagnoses and all orders for this visit:    Partial symptomatic epilepsy with complex partial seizures, not  intractable, without status epilepticus (CMS/HCC)    Dementia with behavioral disturbance, unspecified dementia type (CMS/HCC)    Other orders  -     rivastigmine (EXELON) 4.6 MG/24HR patch; Place 1 patch on the skin as directed by provider Daily.          Discussion/Summary   Tanvi Obregon returnss to clinic today with a history of dementia (suspected mixed VaD/AD), stroke, and seizure. I reviewed various treatment options including trials of other cognitive enhancers and prn usage of Seroquel in addition to her scheduled dosage of 25 mg qhs. She will then follow up in 6 months, or sooner if needed.     As part of this visit I reviewed radiology images and obtained additional history from the family which is incorporated in the HPI.    This visit was conducted by telephone, with 20 minutes of discussion.    Neal Shrestha MD